# Patient Record
(demographics unavailable — no encounter records)

---

## 2025-01-28 NOTE — HISTORY OF PRESENT ILLNESS
[FreeTextEntry1] : 39 year old  female with PMH of exercise induced asthma presents for vascular consultation.  Reports a recent onset of left calf pain that began in December and had recently worsened which prompted ED visit, subsequent duplex and diagnosis with deep venous thrombosis.  Patient has since been on therapeutic xarelto 15mg BID dosing.  At the time of diagnosis was on OCP which she has since discontinued.  Chest CT performed was negative for PE.  Denies previous thrombotic episodes or known personal history or workup for thrombophilia. Reports a family history of thrombosis, grandmother(circumstances of episode unknown).    Avid runner, triathlete;  Admits to some increasing sob with exercise surrounding episode. Denies provocative injury, immobilization, malignancy or h/o miscarriages.  Traveled to [place] in December.   Pending hematology workup along with follow up with fertility physician as she has previously frozen her eggs and was planning to attempt conception in the near future.  Denies previous mammography.  Currently not using compression hose as she was instructed from the ED to avoid compression.

## 2025-01-28 NOTE — PHYSICAL EXAM
[2+] : left 2+ [FreeTextEntry1] : LE warm, well perfused  brisk cap refill throughout, motor/sensory intact  trace left distal calf / ankle edema  no SVT  motor/sensory intact  no phlegmasia

## 2025-01-28 NOTE — DATA REVIEWED
[FreeTextEntry1] : Left lower extremity VDX today:  CFV patent; duplicate femoral vein, one of two with acute non occlusive thrombosis, popliteal occlusive, one ptv accute occlusive DVT  Previous duplex: - mid fv, pop, gastroc, peroneal acute DVT;  non occlusive femoral vein, occlusive pop;  CFV patent

## 2025-01-28 NOTE — REASON FOR VISIT
[Consultation] : a consultation visit [FreeTextEntry1] : Left lower extremity DVT, diagnosed at [facility]

## 2025-01-28 NOTE — ASSESSMENT
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

## 2025-02-18 NOTE — ASSESSMENT
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 with left femoropopliteal DVT while on OCP, some recanalization noted from previous duplex -recommend continued compression hose use  -agree with hematology, 6month course of AC -will RTC in 3 months for surveillance duplex  -advised to RTC in the event of new or worsening leg pain/edema

## 2025-02-18 NOTE — DATA REVIEWED
[FreeTextEntry1] : Venous Duplex today:  Chronic Changes in the left distal femoral vein (1 of 2), subacute non occlusive popliteal venous thrombosis, chronic changes within the gastrocnemial, PTV and peroneal vein    (previous duplex-acute non occlusive 1 of 2 femoral vein and PTV Acute occlusive DVT of the popliteal, gastroc / peroneal 
no

## 2025-02-18 NOTE — HISTORY OF PRESENT ILLNESS
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  Presents for follow up with duplex surveillance.  Remains on therapeutic anticoagulation with xarelto and is tolerating well.  Does admit to moderate improvement in leg pain and edema.  Compliant with thigh high compression hose.  Denies new sob or cp.  Has undergone hematology workup which she states was negative for thrombophilia.  As per patient, heme recommendations for 6 months therapeutic anticoagulation.  No longer on OCP.  Continues to remain active and is swimming regularly.

## 2025-05-15 NOTE — RESULTS/DATA
[TextEntry] : Pelvic u/s 4/18/2025    Transvaginal Ultrasound Nonobstetrical ---------------------------------------------------------------------- Indications   Menorrhagia                                    N92.0  Fibroids                                       D21.9 ---------------------------------------------------------------------- History   Age:   39  LMP:   03/24/25            Day Of Cycle:   26 ---------------------------------------------------------------------- Uterus   Position:   Anteverted  Size (cm)    L:  8.21      W:   5.31       H:  3.82 ---------------------------------------------------------------------- Myomas   Site                 L(cm)     W(cm)     D(cm)     Location  Right                2.78      2.9       2.33      Intramural  Posterior            1.28      1.08      1.19      Intramural  Central              1.1       1.01      1.22      Submucosal  Central              1.46      1.44      1.54      Submucosal  Anterior             0.8       1         0.69      Intramural ----------------------------------------------------------------------   Blood Flow              RI       PI       Comments  ---------------------------------------------------------------------- Endometrium   Thickness(mm):        4.55 ---------------------------------------------------------------------- Cervix   Appears unremarkable. ---------------------------------------------------------------------- [place]   No free fluid is visualized. ---------------------------------------------------------------------- Right Ovary   Size (cm)    L:  2.69      W:   1.55       H:  1.32  Vol (ml):    2.88 ---------------------------------------------------------------------- Left Ovary   Size (cm)    L:  3.18      W:   2.9        H:  2.33  Vol (ml):    11.25  Comment:     1.9cm follicle ---------------------------------------------------------------------- Comments   Patient is S/P Aveta procedure for suspected polyps -  pathology was suggestive of submucosal myoma  fragments ---------------------------------------------------------------------- Impression   Anteverted uterus with multiple myomata noted as  described, two of which appear to be submucosal.  Endometrium measures 4.6mm but is suboptimally  imaged due to myomata.  Normal appearing ovaries  with a 1.9cm dominant follicle on the left. No adnexal  masses or free fluid seen.

## 2025-05-15 NOTE — HISTORY OF PRESENT ILLNESS
[Patient reported PAP Smear was normal] : Patient reported PAP Smear was normal [Menarche Age: ____] : age at menarche was [unfilled] [Currently Active] : currently active [No] : No [FreeTextEntry1] : Patient is a 40 yo here for surgical consultation due to fibroids and heavy menstrual bleeding. She notes intense pain and heavy cycles. Recent pelvic u/s done noting 8 cm uterus with ultiple < 1 cm - 3 cm myomas (5 noted) with a centra 1 cm SM and another central 1.5 cm SM myoma, ems 4.6 mm, b/l ovaries normal. She recently underewent office hysteroscopy  and a large vascular polyp was seen involving 2/3's of the cavity and resected, it was terminated prior to entire polyp removal, D&C also done. Path resulted as endometrium and polypoid fragments of smooth muscle suggesting fibroid and benign EMB.   She recently got 2 iv iron infusions due to anemia.   Gyn Hx +Prior hx of fibroids, +hx of ovarian cyst   [PapSmeardate] : 04/25

## 2025-05-20 NOTE — ASSESSMENT
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 with h/o LLE DVT while on OCP, no thrombophilia on heme workup now with minimal post thrombotic reflux -complete PO course of xarelto  -continue compression hose  -will RTC in august for f/u duplex prior to gyn procedure, sooner in the event of new or worsening pain  -advised to discuss continued ac recommendations if becomes pregnant with hematologist

## 2025-05-20 NOTE — DATA REVIEWED
[FreeTextEntry1] : LLE VDX today:  no evidence of acute DVT minimal post thrombotic changes in the left distal femoral vein, prox peroneal and gastrocnemius veins with reflux;

## 2025-05-20 NOTE — HISTORY OF PRESENT ILLNESS
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 Presents for follow up with duplex surveillance. Remains on therapeutic anticoagulation with xarelto and is tolerating well. Does admit to moderate improvement in leg pain and edema. Compliant with thigh high compression hose. Denies new sob or cp. Has undergone hematology workup which she states was negative for thrombophilia. As per patient, heme recommendations for 6 months therapeutic anticoagulation (ending in July). No longer on OCP. Continues to remain active and is swimming regularly.   Plans to attempt pregnancy at some point in the fall.